# Patient Record
Sex: MALE | URBAN - METROPOLITAN AREA
[De-identification: names, ages, dates, MRNs, and addresses within clinical notes are randomized per-mention and may not be internally consistent; named-entity substitution may affect disease eponyms.]

---

## 2018-04-01 ENCOUNTER — OUTPATIENT (OUTPATIENT)
Dept: OUTPATIENT SERVICES | Facility: HOSPITAL | Age: 38
LOS: 1 days | End: 2018-04-01
Payer: MEDICAID

## 2018-04-01 PROCEDURE — G9001: CPT

## 2018-04-18 ENCOUNTER — OUTPATIENT (OUTPATIENT)
Dept: OUTPATIENT SERVICES | Facility: HOSPITAL | Age: 38
LOS: 1 days | End: 2018-04-18

## 2018-04-18 VITALS
HEIGHT: 70 IN | WEIGHT: 195.99 LBS | SYSTOLIC BLOOD PRESSURE: 130 MMHG | RESPIRATION RATE: 16 BRPM | DIASTOLIC BLOOD PRESSURE: 86 MMHG | HEART RATE: 68 BPM | TEMPERATURE: 98 F

## 2018-04-18 DIAGNOSIS — M26.02 MAXILLARY HYPOPLASIA: ICD-10-CM

## 2018-04-18 LAB
BLD GP AB SCN SERPL QL: NEGATIVE — SIGNIFICANT CHANGE UP
HCT VFR BLD CALC: 40 % — SIGNIFICANT CHANGE UP (ref 39–50)
HGB BLD-MCNC: 13.5 G/DL — SIGNIFICANT CHANGE UP (ref 13–17)
MCHC RBC-ENTMCNC: 28.5 PG — SIGNIFICANT CHANGE UP (ref 27–34)
MCHC RBC-ENTMCNC: 33.8 % — SIGNIFICANT CHANGE UP (ref 32–36)
MCV RBC AUTO: 84.6 FL — SIGNIFICANT CHANGE UP (ref 80–100)
NRBC # FLD: 0 — SIGNIFICANT CHANGE UP
PLATELET # BLD AUTO: 234 K/UL — SIGNIFICANT CHANGE UP (ref 150–400)
PMV BLD: 10.2 FL — SIGNIFICANT CHANGE UP (ref 7–13)
RBC # BLD: 4.73 M/UL — SIGNIFICANT CHANGE UP (ref 4.2–5.8)
RBC # FLD: 11.7 % — SIGNIFICANT CHANGE UP (ref 10.3–14.5)
RH IG SCN BLD-IMP: POSITIVE — SIGNIFICANT CHANGE UP
WBC # BLD: 5.58 K/UL — SIGNIFICANT CHANGE UP (ref 3.8–10.5)
WBC # FLD AUTO: 5.58 K/UL — SIGNIFICANT CHANGE UP (ref 3.8–10.5)

## 2018-04-18 RX ORDER — SODIUM CHLORIDE 9 MG/ML
1000 INJECTION, SOLUTION INTRAVENOUS
Qty: 0 | Refills: 0 | Status: DISCONTINUED | OUTPATIENT
Start: 2018-04-24 | End: 2018-04-24

## 2018-04-18 RX ORDER — SODIUM CHLORIDE 9 MG/ML
3 INJECTION INTRAMUSCULAR; INTRAVENOUS; SUBCUTANEOUS EVERY 8 HOURS
Qty: 0 | Refills: 0 | Status: DISCONTINUED | OUTPATIENT
Start: 2018-04-24 | End: 2018-04-25

## 2018-04-18 NOTE — H&P PST ADULT - PROBLEM SELECTOR PLAN 1
Maxillary Lefort 1, 2 Piece Osteotomy with Bone Graft, Bilateral Sagittal Splint Osteotomies with Rigid Fixation scheduled on 4/24/18.  Pre-op instructions provided. Pt verbalized understanding.   Pepcid provided for GI prophylaxis.

## 2018-04-18 NOTE — H&P PST ADULT - MUSCULOSKELETAL
details… detailed exam no joint swelling/no joint erythema/normal strength/no joint warmth/no calf tenderness/ROM intact

## 2018-04-18 NOTE — H&P PST ADULT - NEGATIVE ENMT SYMPTOMS
no vertigo/no tinnitus/no throat pain/no dysphagia/no ear pain/no hearing difficulty/no sinus symptoms

## 2018-04-18 NOTE — H&P PST ADULT - HISTORY OF PRESENT ILLNESS
37 year old male with misaligmnent of jaw presents today for presurgical evaluation for ... 37 year old male with misalignment of jaw presents today for presurgical evaluation for Maxillary Lefort 1, 2 Piece Osteotomy with Bone Graft, Bilateral Sagittal Splint Osteotomies with Rigid Fixation scheduled on 4/24/18.

## 2018-04-18 NOTE — H&P PST ADULT - NSANTHOSAYNRD_GEN_A_CORE
No. ADRIANA screening performed.  STOP BANG Legend: 0-2 = LOW Risk; 3-4 = INTERMEDIATE Risk; 5-8 = HIGH Risk

## 2018-04-18 NOTE — H&P PST ADULT - CARDIOVASCULAR
[facility] Hospitalist Progress Note                                                                   [facility] SYLVESTER May  7/3/1961    SUBJECTIVE: pt denies cp or sob.  Had NSVT asymptomatic    OBJECTIVE: on statin    PPX: SCDs    DISPO: transfer to [address] [place] [address] [facility]  [phone] details… detailed exam

## 2018-04-18 NOTE — H&P PST ADULT - FAMILY HISTORY
Mother  Still living? Unknown  Diabetes mellitus, Age at diagnosis: Age Unknown  Hypertension, Age at diagnosis: Age Unknown

## 2018-04-23 NOTE — ASU PATIENT PROFILE, ADULT - VISION (WITH CORRECTIVE LENSES IF THE PATIENT USUALLY WEARS THEM):
glasses/contacts/Normal vision: sees adequately in most situations; can see medication labels, newsprint

## 2018-04-24 ENCOUNTER — INPATIENT (INPATIENT)
Facility: HOSPITAL | Age: 38
LOS: 0 days | Discharge: HOME | End: 2018-04-25
Attending: ORAL & MAXILLOFACIAL SURGERY | Admitting: ORAL & MAXILLOFACIAL SURGERY

## 2018-04-24 VITALS
SYSTOLIC BLOOD PRESSURE: 139 MMHG | TEMPERATURE: 99 F | OXYGEN SATURATION: 100 % | WEIGHT: 195.99 LBS | DIASTOLIC BLOOD PRESSURE: 80 MMHG | HEIGHT: 70 IN | HEART RATE: 67 BPM | RESPIRATION RATE: 16 BRPM

## 2018-04-24 DIAGNOSIS — M26.02 MAXILLARY HYPOPLASIA: ICD-10-CM

## 2018-04-24 LAB — RH IG SCN BLD-IMP: POSITIVE — SIGNIFICANT CHANGE UP

## 2018-04-24 RX ORDER — METOCLOPRAMIDE HCL 10 MG
10 TABLET ORAL EVERY 6 HOURS
Qty: 0 | Refills: 0 | Status: DISCONTINUED | OUTPATIENT
Start: 2018-04-24 | End: 2018-04-25

## 2018-04-24 RX ORDER — ONDANSETRON 8 MG/1
4 TABLET, FILM COATED ORAL ONCE
Qty: 0 | Refills: 0 | Status: DISCONTINUED | OUTPATIENT
Start: 2018-04-24 | End: 2018-04-24

## 2018-04-24 RX ORDER — PENICILLIN G POTASSIUM 5000000 [IU]/1
2 POWDER, FOR SOLUTION INTRAMUSCULAR; INTRAPLEURAL; INTRATHECAL; INTRAVENOUS EVERY 4 HOURS
Qty: 0 | Refills: 0 | Status: DISCONTINUED | OUTPATIENT
Start: 2018-04-24 | End: 2018-04-25

## 2018-04-24 RX ORDER — OXYCODONE HYDROCHLORIDE 5 MG/1
10 TABLET ORAL EVERY 6 HOURS
Qty: 0 | Refills: 0 | Status: DISCONTINUED | OUTPATIENT
Start: 2018-04-24 | End: 2018-04-25

## 2018-04-24 RX ORDER — ACETAMINOPHEN 500 MG
650 TABLET ORAL EVERY 6 HOURS
Qty: 0 | Refills: 0 | Status: DISCONTINUED | OUTPATIENT
Start: 2018-04-24 | End: 2018-04-25

## 2018-04-24 RX ORDER — OXYMETAZOLINE HYDROCHLORIDE 0.5 MG/ML
2 SPRAY NASAL
Qty: 0 | Refills: 0 | Status: DISCONTINUED | OUTPATIENT
Start: 2018-04-24 | End: 2018-04-25

## 2018-04-24 RX ORDER — MORPHINE SULFATE 50 MG/1
1 CAPSULE, EXTENDED RELEASE ORAL
Qty: 0 | Refills: 0 | Status: DISCONTINUED | OUTPATIENT
Start: 2018-04-24 | End: 2018-04-25

## 2018-04-24 RX ORDER — FLUTICASONE PROPIONATE 50 MCG
1 SPRAY, SUSPENSION NASAL
Qty: 0 | Refills: 0 | Status: DISCONTINUED | OUTPATIENT
Start: 2018-04-24 | End: 2018-04-25

## 2018-04-24 RX ORDER — CHLORHEXIDINE GLUCONATE 213 G/1000ML
15 SOLUTION TOPICAL
Qty: 0 | Refills: 0 | Status: DISCONTINUED | OUTPATIENT
Start: 2018-04-24 | End: 2018-04-25

## 2018-04-24 RX ORDER — ONDANSETRON 8 MG/1
4 TABLET, FILM COATED ORAL EVERY 6 HOURS
Qty: 0 | Refills: 0 | Status: DISCONTINUED | OUTPATIENT
Start: 2018-04-24 | End: 2018-04-25

## 2018-04-24 RX ORDER — DEXTROSE MONOHYDRATE, SODIUM CHLORIDE, AND POTASSIUM CHLORIDE 50; .745; 4.5 G/1000ML; G/1000ML; G/1000ML
1000 INJECTION, SOLUTION INTRAVENOUS
Qty: 0 | Refills: 0 | Status: DISCONTINUED | OUTPATIENT
Start: 2018-04-24 | End: 2018-04-25

## 2018-04-24 RX ORDER — HYDROMORPHONE HYDROCHLORIDE 2 MG/ML
0.5 INJECTION INTRAMUSCULAR; INTRAVENOUS; SUBCUTANEOUS
Qty: 0 | Refills: 0 | Status: DISCONTINUED | OUTPATIENT
Start: 2018-04-24 | End: 2018-04-24

## 2018-04-24 RX ORDER — OXYCODONE HYDROCHLORIDE 5 MG/1
5 TABLET ORAL EVERY 6 HOURS
Qty: 0 | Refills: 0 | Status: DISCONTINUED | OUTPATIENT
Start: 2018-04-24 | End: 2018-04-25

## 2018-04-24 RX ORDER — SODIUM CHLORIDE 0.65 %
2 AEROSOL, SPRAY (ML) NASAL
Qty: 0 | Refills: 0 | Status: DISCONTINUED | OUTPATIENT
Start: 2018-04-24 | End: 2018-04-25

## 2018-04-24 RX ORDER — IBUPROFEN 200 MG
600 TABLET ORAL EVERY 6 HOURS
Qty: 0 | Refills: 0 | Status: DISCONTINUED | OUTPATIENT
Start: 2018-04-24 | End: 2018-04-25

## 2018-04-24 RX ADMIN — OXYMETAZOLINE HYDROCHLORIDE 2 SPRAY(S): 0.5 SPRAY NASAL at 19:01

## 2018-04-24 RX ADMIN — Medication 1 SPRAY(S): at 19:01

## 2018-04-24 RX ADMIN — PENICILLIN G POTASSIUM 100 MILLION UNIT(S): 5000000 POWDER, FOR SOLUTION INTRAMUSCULAR; INTRAPLEURAL; INTRATHECAL; INTRAVENOUS at 19:00

## 2018-04-24 RX ADMIN — DEXTROSE MONOHYDRATE, SODIUM CHLORIDE, AND POTASSIUM CHLORIDE 125 MILLILITER(S): 50; .745; 4.5 INJECTION, SOLUTION INTRAVENOUS at 15:11

## 2018-04-24 RX ADMIN — Medication 600 MILLIGRAM(S): at 23:48

## 2018-04-24 RX ADMIN — PENICILLIN G POTASSIUM 100 MILLION UNIT(S): 5000000 POWDER, FOR SOLUTION INTRAMUSCULAR; INTRAPLEURAL; INTRATHECAL; INTRAVENOUS at 15:22

## 2018-04-24 RX ADMIN — Medication 600 MILLIGRAM(S): at 19:01

## 2018-04-24 RX ADMIN — PENICILLIN G POTASSIUM 100 MILLION UNIT(S): 5000000 POWDER, FOR SOLUTION INTRAMUSCULAR; INTRAPLEURAL; INTRATHECAL; INTRAVENOUS at 23:48

## 2018-04-24 RX ADMIN — Medication 600 MILLIGRAM(S): at 19:31

## 2018-04-24 RX ADMIN — SODIUM CHLORIDE 3 MILLILITER(S): 9 INJECTION INTRAMUSCULAR; INTRAVENOUS; SUBCUTANEOUS at 21:24

## 2018-04-24 RX ADMIN — OXYCODONE HYDROCHLORIDE 5 MILLIGRAM(S): 5 TABLET ORAL at 16:00

## 2018-04-24 RX ADMIN — CHLORHEXIDINE GLUCONATE 15 MILLILITER(S): 213 SOLUTION TOPICAL at 19:01

## 2018-04-24 RX ADMIN — SODIUM CHLORIDE 3 MILLILITER(S): 9 INJECTION INTRAMUSCULAR; INTRAVENOUS; SUBCUTANEOUS at 15:11

## 2018-04-24 RX ADMIN — OXYCODONE HYDROCHLORIDE 5 MILLIGRAM(S): 5 TABLET ORAL at 15:10

## 2018-04-24 NOTE — PROGRESS NOTE ADULT - SUBJECTIVE AND OBJECTIVE BOX
OMFS Post-op Note    37y Male s/p Le Fort 1 osteotomy.  Patient examined at bedside in PACU.  TERESITA since OR. Patient states pain is well controlled.  Denies any fever, chills, nausea, vomiting.  Patient ambulating, voiding, tolerating PO.    Vital Signs Last 24 Hrs  T(C): 36.9 (24 Apr 2018 12:30), Max: 37.1 (24 Apr 2018 06:23)  T(F): 98.4 (24 Apr 2018 12:30), Max: 98.8 (24 Apr 2018 10:40)  HR: 69 (24 Apr 2018 12:30) (62 - 71)  BP: 119/72 (24 Apr 2018 12:30) (111/53 - 139/80)  BP(mean): --  RR: 13 (24 Apr 2018 12:30) (13 - 18)  SpO2: 96% (24 Apr 2018 12:30) (96% - 100%)    PE:   Gen: AAOx3, NAD  EOE: b/l midface edema  IOE: Occlusion stable and reproducible, gingiva pink and perfused, elastics intact.  Sutures intact.  Wounds hemostatic.    CV: RRR, normal s1/s2  PE: CTAB  Extremities: no edema            I&O's Summary      A/P:  37y Male Le Fort 1 osteotomy. Patient recovering well.  - Continue antibiotics  - Continue pain control  - Encourage PO fluids  - Encourage ambulation  - DVT PPX

## 2018-04-25 VITALS
HEART RATE: 75 BPM | SYSTOLIC BLOOD PRESSURE: 125 MMHG | RESPIRATION RATE: 18 BRPM | DIASTOLIC BLOOD PRESSURE: 84 MMHG | OXYGEN SATURATION: 94 % | TEMPERATURE: 98 F

## 2018-04-25 DIAGNOSIS — R69 ILLNESS, UNSPECIFIED: ICD-10-CM

## 2018-04-25 RX ORDER — OXYMETAZOLINE HYDROCHLORIDE 0.5 MG/ML
1 SPRAY NASAL
Qty: 0 | Refills: 0 | COMMUNITY
Start: 2018-04-25 | End: 2018-04-26

## 2018-04-25 RX ORDER — OXYCODONE HYDROCHLORIDE 5 MG/1
5 TABLET ORAL
Qty: 0 | Refills: 0 | COMMUNITY
Start: 2018-04-25

## 2018-04-25 RX ORDER — AMOXICILLIN 250 MG/5ML
500 SUSPENSION, RECONSTITUTED, ORAL (ML) ORAL ONCE
Qty: 0 | Refills: 0 | Status: DISCONTINUED | OUTPATIENT
Start: 2018-04-25 | End: 2018-04-25

## 2018-04-25 RX ORDER — AMOXICILLIN 250 MG/5ML
500 SUSPENSION, RECONSTITUTED, ORAL (ML) ORAL ONCE
Qty: 0 | Refills: 0 | Status: COMPLETED | OUTPATIENT
Start: 2018-04-25 | End: 2018-04-25

## 2018-04-25 RX ORDER — AMOXICILLIN 250 MG/5ML
10 SUSPENSION, RECONSTITUTED, ORAL (ML) ORAL
Qty: 0 | Refills: 0 | COMMUNITY
Start: 2018-04-25

## 2018-04-25 RX ORDER — CHLORHEXIDINE GLUCONATE 213 G/1000ML
15 SOLUTION TOPICAL
Qty: 0 | Refills: 0 | COMMUNITY
Start: 2018-04-25

## 2018-04-25 RX ORDER — IBUPROFEN 200 MG
30 TABLET ORAL
Qty: 0 | Refills: 0 | COMMUNITY
Start: 2018-04-25

## 2018-04-25 RX ORDER — SODIUM CHLORIDE 0.65 %
1 AEROSOL, SPRAY (ML) NASAL
Qty: 0 | Refills: 0 | COMMUNITY
Start: 2018-04-25

## 2018-04-25 RX ORDER — FLUTICASONE PROPIONATE 50 MCG
1 SPRAY, SUSPENSION NASAL
Qty: 0 | Refills: 0 | COMMUNITY
Start: 2018-04-25

## 2018-04-25 RX ADMIN — Medication 1 SPRAY(S): at 07:02

## 2018-04-25 RX ADMIN — CHLORHEXIDINE GLUCONATE 15 MILLILITER(S): 213 SOLUTION TOPICAL at 07:03

## 2018-04-25 RX ADMIN — Medication 500 MILLIGRAM(S): at 10:52

## 2018-04-25 RX ADMIN — Medication 600 MILLIGRAM(S): at 00:31

## 2018-04-25 RX ADMIN — OXYMETAZOLINE HYDROCHLORIDE 2 SPRAY(S): 0.5 SPRAY NASAL at 07:03

## 2018-04-25 RX ADMIN — Medication 600 MILLIGRAM(S): at 07:03

## 2018-04-25 RX ADMIN — Medication 600 MILLIGRAM(S): at 07:34

## 2018-04-25 NOTE — DISCHARGE NOTE ADULT - HOSPITAL COURSE
36 yo M presents for Lefort 1 with Dr. Kyle in OR. Operation completed without complication. Extubated in OR and transferred to PACU in stable condition. When PACU criteria met, transferred to floor. TERESITA o/n. In morning Patient examined and pain continues to be well controlled, tolerating PO, ambulating, voiding. AFVSS. Hemostatic, Occlusion stable and reproducible and gingiva pink and well perfused. Pt stable for discharge home on PO medications

## 2018-04-25 NOTE — DISCHARGE NOTE ADULT - CARE PROVIDER_API CALL
Jose Kyle (DDS), OralMaxillofacial Surgery  [address] [id number]  Phone: [phone]  Fax: [phone]

## 2018-04-25 NOTE — DISCHARGE NOTE ADULT - MEDICATION SUMMARY - MEDICATIONS TO TAKE
I will START or STAY ON the medications listed below when I get home from the hospital:    ibuprofen 100 mg/5 mL oral suspension  -- 30 milliliter(s) by mouth every 6 hours  -- Indication: For for pain    oxyCODONE 5 mg/5 mL oral solution  -- 5 milliliter(s) by mouth every 6 hours, As needed, Moderate Pain (4 - 6)  -- Indication: For for pain, only take as needed    chlorhexidine 0.12% mucous membrane liquid  -- 15 milliliter(s) mucous membrane 2 times a day  -- Indication: For mouth rinse    fluticasone 50 mcg/inh nasal spray  -- 1 spray(s) into nose 2 times a day  -- Indication: For Nasal spray    oxymetazoline 0.05% nasal spray  -- 1 spray(s) into nose 2 times a day  -- Indication: For Nasal spray.     sodium chloride 0.65% nasal spray  -- 1 spray(s) into nose every 2 hours, As Needed into nose congestion  -- Indication: For Nasal spray    amoxicillin 250 mg/5 mL oral suspension  -- 10 milliliter(s) by mouth 3 times a day  -- Indication: For antibiotic    multivitamin  -- 1 tab(s) by mouth once a day  -- Indication: For Home med

## 2018-04-25 NOTE — DISCHARGE NOTE ADULT - PATIENT PORTAL LINK FT
You can access the [facility] Patient Portal, offered by [facility], by registering with the following website: [email or web address]

## 2018-04-25 NOTE — PROGRESS NOTE ADULT - SUBJECTIVE AND OBJECTIVE BOX
37y Male 1 day s/p Le Fort 1 osteotomy.  Patient examined at bedside.  TERESITA overnight. Patient states pain is well controlled.  Denies any fever, chills, nausea, vomiting.  Patient ambulating, voiding, tolerating PO.    Vital Signs Last 24 Hrs  T(C): 36.6 (25 Apr 2018 07:04), Max: 37.1 (24 Apr 2018 10:40)  T(F): 97.9 (25 Apr 2018 07:04), Max: 98.8 (24 Apr 2018 10:40)  HR: 84 (25 Apr 2018 07:04) (62 - 88)  BP: 127/72 (25 Apr 2018 07:04) (111/53 - 127/72)  BP(mean): --  RR: 17 (25 Apr 2018 07:04) (13 - 18)  SpO2: 99% (25 Apr 2018 07:04) (96% - 100%)    PE:   Gen: AAOx3, NAD  EOE: b/l midface edema, jaw bra in place  IOE: Occlusion stable and reproducible, gingiva pink and perfused, elastics intact.  Sutures intact.  Wounds hemostatic.    CV: RRR, normal s1/s2  PE: CTAB  Extremities: no edema            I&O's Summary    24 Apr 2018 07:01  -  25 Apr 2018 07:00  --------------------------------------------------------  IN: 2665 mL / OUT: 3800 mL / NET: -1135 mL          A/P: 37y Male 1 day s/p [place] Fort 1 osteotomy. Patient recovering well.  - Continue antibiotics  - Continue pain control  - Encourage PO fluids   - Encourage ambulation  - DVT PPX

## 2018-04-25 NOTE — DISCHARGE NOTE ADULT - CONDITIONS AT DISCHARGE
Alert & oriented. Jaw rubberbanded, scissors provided to patient. Patient is out of bed ambulating. Tolerating  clear diet without nausea or vomiting. Voiding without difficulty. Vitals stable, afebrile. Understands all discharge instruction & will follow up with the MD

## 2021-03-03 NOTE — DISCHARGE NOTE ADULT - CARE PLAN
No Principal Discharge DX:	Maxillary hypoplasia  Goal:	PO intake, Pain control  Assessment and plan of treatment:	PO intake , Pain control

## 2022-03-16 NOTE — DISCHARGE NOTE ADULT - NS AS DC FOLLOWUP STROKE INST
[id number] Received report on patient who will be transferred to [facility] Smoking Cessation

## 2023-06-08 NOTE — ASU PATIENT PROFILE, ADULT - CAREGIVER RELATION TO PATIENT
I have personally provided the amount of critical care time documented below excluding time spent on separate procedures.
sister